# Patient Record
Sex: FEMALE | Race: WHITE | HISPANIC OR LATINO | Employment: FULL TIME | URBAN - METROPOLITAN AREA
[De-identification: names, ages, dates, MRNs, and addresses within clinical notes are randomized per-mention and may not be internally consistent; named-entity substitution may affect disease eponyms.]

---

## 2024-01-13 ENCOUNTER — HOSPITAL ENCOUNTER (EMERGENCY)
Facility: HOSPITAL | Age: 19
Discharge: HOME | End: 2024-01-13
Attending: EMERGENCY MEDICINE

## 2024-01-13 VITALS
SYSTOLIC BLOOD PRESSURE: 130 MMHG | RESPIRATION RATE: 18 BRPM | HEART RATE: 80 BPM | TEMPERATURE: 98 F | OXYGEN SATURATION: 98 % | DIASTOLIC BLOOD PRESSURE: 70 MMHG | WEIGHT: 180.56 LBS

## 2024-01-13 DIAGNOSIS — B96.89 BACTERIAL VAGINOSIS: ICD-10-CM

## 2024-01-13 DIAGNOSIS — N73.0 PID (ACUTE PELVIC INFLAMMATORY DISEASE): ICD-10-CM

## 2024-01-13 DIAGNOSIS — N76.0 BACTERIAL VAGINOSIS: ICD-10-CM

## 2024-01-13 DIAGNOSIS — N89.8 VAGINAL DISCHARGE: Primary | ICD-10-CM

## 2024-01-13 LAB
B-HCG UR QL: NEGATIVE
BACTERIA GENITAL QL WET PREP: ABNORMAL
BILIRUB UR QL STRIP: NEGATIVE
CLARITY UR: CLEAR
CLUE CELLS VAG QL WET PREP: ABNORMAL
COLOR UR: YELLOW
FILAMENT FUNGI VAG WET PREP-#/AREA: ABNORMAL
GLUCOSE UR QL STRIP: NEGATIVE
HGB UR QL STRIP: NEGATIVE
KETONES UR QL STRIP: NEGATIVE
LEUKOCYTE ESTERASE UR QL STRIP: NEGATIVE
NITRITE UR QL STRIP: NEGATIVE
PH UR STRIP: 6 [PH] (ref 5–8)
PROT UR QL STRIP: NEGATIVE
SP GR UR STRIP: 1.01 (ref 1–1.03)
SPECIMEN SOURCE: ABNORMAL
T VAGINALIS GENITAL QL WET PREP: ABNORMAL
URN SPEC COLLECT METH UR: NORMAL
UROBILINOGEN UR STRIP-ACNC: NEGATIVE EU/DL
WBC #/AREA VAG WET PREP: ABNORMAL
YEAST GENITAL QL WET PREP: ABNORMAL

## 2024-01-13 PROCEDURE — 81003 URINALYSIS AUTO W/O SCOPE: CPT | Performed by: REGISTERED NURSE

## 2024-01-13 PROCEDURE — 81025 URINE PREGNANCY TEST: CPT | Performed by: REGISTERED NURSE

## 2024-01-13 PROCEDURE — 63600175 PHARM REV CODE 636 W HCPCS: Performed by: REGISTERED NURSE

## 2024-01-13 PROCEDURE — 96372 THER/PROPH/DIAG INJ SC/IM: CPT | Performed by: REGISTERED NURSE

## 2024-01-13 PROCEDURE — 87491 CHLMYD TRACH DNA AMP PROBE: CPT | Performed by: REGISTERED NURSE

## 2024-01-13 PROCEDURE — 87210 SMEAR WET MOUNT SALINE/INK: CPT | Performed by: REGISTERED NURSE

## 2024-01-13 PROCEDURE — 99284 EMERGENCY DEPT VISIT MOD MDM: CPT

## 2024-01-13 RX ORDER — DOXYCYCLINE 100 MG/1
100 CAPSULE ORAL 2 TIMES DAILY
Qty: 14 CAPSULE | Refills: 0 | Status: SHIPPED | OUTPATIENT
Start: 2024-01-13 | End: 2024-01-20

## 2024-01-13 RX ORDER — METRONIDAZOLE 500 MG/1
500 TABLET ORAL 2 TIMES DAILY
Qty: 14 TABLET | Refills: 0 | Status: SHIPPED | OUTPATIENT
Start: 2024-01-13 | End: 2024-01-20

## 2024-01-13 RX ORDER — CEFTRIAXONE 500 MG/1
500 INJECTION, POWDER, FOR SOLUTION INTRAMUSCULAR; INTRAVENOUS
Status: COMPLETED | OUTPATIENT
Start: 2024-01-13 | End: 2024-01-13

## 2024-01-13 RX ADMIN — CEFTRIAXONE SODIUM 500 MG: 500 INJECTION, POWDER, FOR SOLUTION INTRAMUSCULAR; INTRAVENOUS at 07:01

## 2024-01-14 NOTE — ED NOTES
Patient identifiers verified and correct for Adeline Corral.      Pt presented to with pelvic pain and burning on urination.    LOC: The patient is awake, alert and aware of environment with an appropriate affect, the patient is oriented x 3 and speaking appropriately.  APPEARANCE: Patient resting comfortably and in no acute distress, patient is clean and well groomed, patient's clothing is properly fastened.  SKIN: The skin is warm and dry, color consistent with ethnicity, patient has normal skin turgor and moist mucus membranes, skin intact, no breakdown or bruising noted.  MUSCULOSKELETAL: Patient moving all extremities spontaneously.  RESPIRATORY: Airway is open and patent, respirations are spontaneous.  CARDIAC: Patient has a normal rate, no periphreal edema noted, capillary refill < 3 seconds.  ABDOMEN: Soft and non tender to palpation. Lower abdomen pain

## 2024-01-14 NOTE — ED PROVIDER NOTES
Encounter Date: 1/13/2024       History     Chief Complaint   Patient presents with    Pelvic Pain     Pelvic area pain x 2 months, worse today, pain increases after urinating, denies burning, + vaginal discharge (white), pain after intercourse. Denies fever, nausea or vomiting.     18-year-old female presents emergency department with complaints of vaginal discharge for the last 2 months.  Patient reports pain is worse today.  Associated symptoms include pelvic pain.  Denies any nausea/vomiting    The history is provided by the patient.     Review of patient's allergies indicates:  No Known Allergies  No past medical history on file.  No past surgical history on file.  No family history on file.     Review of Systems   Constitutional:  Negative for fever.   HENT:  Negative for sore throat.    Respiratory:  Negative for shortness of breath.    Cardiovascular:  Negative for chest pain.   Gastrointestinal:  Negative for nausea.   Genitourinary:  Positive for dyspareunia, dysuria, pelvic pain and vaginal discharge.   Musculoskeletal:  Negative for back pain.   Skin:  Negative for rash.   Neurological:  Negative for weakness.   Hematological:  Does not bruise/bleed easily.   All other systems reviewed and are negative.      Physical Exam     Initial Vitals [01/13/24 1756]   BP Pulse Resp Temp SpO2   126/66 76 16 98.6 °F (37 °C) 98 %      MAP       --         Physical Exam    Constitutional: She appears well-developed and well-nourished. She is not diaphoretic. No distress.   HENT:   Head: Normocephalic and atraumatic.   Eyes: Conjunctivae and EOM are normal. Pupils are equal, round, and reactive to light.   Neck: Neck supple.   Normal range of motion.  Cardiovascular:  Normal rate, regular rhythm and normal heart sounds.           No murmur heard.  Pulmonary/Chest: Breath sounds normal. No respiratory distress. She has no wheezes. She has no rales.   Abdominal: Abdomen is soft. Bowel sounds are normal. There is no  abdominal tenderness. There is no rebound and no guarding.   Genitourinary:    Genitourinary Comments: Pelvic: A female chaperone was present for this examination. Nl external inspection. No lesions or abnormalities were visible on the labia majora or minora. Cervical os is closed. There is CMT. There is no blood in the vaginal vault. Scant discharge. Mild R adnexal tenderness. No adnexal masses.       Musculoskeletal:         General: No tenderness or edema. Normal range of motion.      Cervical back: Normal range of motion and neck supple.     Neurological: She is alert and oriented to person, place, and time. No cranial nerve deficit. GCS score is 15. GCS eye subscore is 4. GCS verbal subscore is 5. GCS motor subscore is 6.   Skin: Skin is warm and dry. Capillary refill takes less than 2 seconds.   Psychiatric: She has a normal mood and affect. Thought content normal.         ED Course   Procedures  Labs Reviewed   VAGINAL SCREEN - Abnormal; Notable for the following components:       Result Value    Clue Cells Occasional (*)     WBC - Vaginal Screen Rare (*)     Bacteria - Vaginal Screen Occasional (*)     All other components within normal limits    Narrative:     Release to patient->Immediate   C. TRACHOMATIS/N. GONORRHOEAE BY AMP DNA   URINALYSIS, REFLEX TO URINE CULTURE    Narrative:     Specimen Source->Urine   PREGNANCY TEST, URINE RAPID    Narrative:     Specimen Source->Urine          Imaging Results    None          Medications   cefTRIAXone injection 500 mg (has no administration in time range)     Medical Decision Making  Amount and/or Complexity of Data Reviewed  Labs: ordered.     Details: Clue cells, bacteria    Risk  Prescription drug management.  Risk Details: Treat for suspected PID.  Return the emergency department for worsening symptoms.  Follow up with primary care next week.                                      Clinical Impression:  Final diagnoses:  [N89.8] Vaginal discharge  (Primary)  [N73.0] PID (acute pelvic inflammatory disease)  [N76.0, B96.89] Bacterial vaginosis          ED Disposition Condition    Discharge Stable          ED Prescriptions       Medication Sig Dispense Start Date End Date Auth. Provider    doxycycline (VIBRAMYCIN) 100 MG Cap Take 1 capsule (100 mg total) by mouth 2 (two) times daily. for 7 days 14 capsule 1/13/2024 1/20/2024 Lyle Arriaga Jr., FNP    metroNIDAZOLE (FLAGYL) 500 MG tablet Take 1 tablet (500 mg total) by mouth 2 (two) times daily. for 7 days 14 tablet 1/13/2024 1/20/2024 Lyle Arriaga Jr., FNP          Follow-up Information       Follow up With Specialties Details Why Contact Info    [facility] - Emergency Dept. Emergency Medicine  If symptoms worsen [id number] [facility] [id number] [phone]             Lyle Arriaga Jr., FNP  01/13/24 6381

## 2024-01-16 LAB
C TRACH DNA SPEC QL NAA+PROBE: NOT DETECTED
N GONORRHOEA DNA SPEC QL NAA+PROBE: NOT DETECTED

## 2025-04-27 ENCOUNTER — HOSPITAL ENCOUNTER (EMERGENCY)
Facility: HOSPITAL | Age: 20
Discharge: HOME | End: 2025-04-27
Attending: EMERGENCY MEDICINE

## 2025-04-27 VITALS
OXYGEN SATURATION: 100 % | WEIGHT: 150.63 LBS | RESPIRATION RATE: 20 BRPM | TEMPERATURE: 99 F | SYSTOLIC BLOOD PRESSURE: 117 MMHG | HEIGHT: 64 IN | HEART RATE: 74 BPM | DIASTOLIC BLOOD PRESSURE: 64 MMHG | BODY MASS INDEX: 25.71 KG/M2

## 2025-04-27 DIAGNOSIS — I95.1 ORTHOSTATIC HYPOTENSION: Primary | ICD-10-CM

## 2025-04-27 DIAGNOSIS — R42 DIZZINESS: ICD-10-CM

## 2025-04-27 LAB
ABSOLUTE EOSINOPHIL (OHS): 0.26 K/UL
ABSOLUTE MONOCYTE (OHS): 0.52 K/UL (ref 0.3–1)
ABSOLUTE NEUTROPHIL COUNT (OHS): 3.02 K/UL (ref 1.8–7.7)
ALBUMIN SERPL BCP-MCNC: 3.9 G/DL (ref 3.5–5.2)
ALP SERPL-CCNC: 51 UNIT/L (ref 40–150)
ALT SERPL W/O P-5'-P-CCNC: 15 UNIT/L (ref 10–44)
ANION GAP (OHS): 8 MMOL/L (ref 8–16)
AST SERPL-CCNC: 16 UNIT/L (ref 11–45)
B-HCG UR QL: NEGATIVE
BASOPHILS # BLD AUTO: 0.03 K/UL
BASOPHILS NFR BLD AUTO: 0.4 %
BILIRUB SERPL-MCNC: 0.4 MG/DL (ref 0.1–1)
BILIRUB UR QL STRIP.AUTO: NEGATIVE
BUN SERPL-MCNC: 18 MG/DL (ref 6–20)
CALCIUM SERPL-MCNC: 8.7 MG/DL (ref 8.7–10.5)
CHLORIDE SERPL-SCNC: 106 MMOL/L (ref 95–110)
CLARITY UR: CLEAR
CO2 SERPL-SCNC: 25 MMOL/L (ref 23–29)
COLOR UR AUTO: COLORLESS
CREAT SERPL-MCNC: 0.7 MG/DL (ref 0.5–1.4)
ERYTHROCYTE [DISTWIDTH] IN BLOOD BY AUTOMATED COUNT: 13 % (ref 11.5–14.5)
GFR SERPLBLD CREATININE-BSD FMLA CKD-EPI: >60 ML/MIN/1.73/M2
GLUCOSE SERPL-MCNC: 88 MG/DL (ref 70–110)
GLUCOSE UR QL STRIP: NEGATIVE
HCT VFR BLD AUTO: 38.8 % (ref 37–48.5)
HCV AB SERPL QL IA: NEGATIVE
HGB BLD-MCNC: 12.7 GM/DL (ref 12–16)
HGB UR QL STRIP: NEGATIVE
HIV 1+2 AB+HIV1 P24 AG SERPL QL IA: NEGATIVE
HOLD SPECIMEN: NORMAL
IMM GRANULOCYTES # BLD AUTO: 0.01 K/UL (ref 0–0.04)
IMM GRANULOCYTES NFR BLD AUTO: 0.1 % (ref 0–0.5)
KETONES UR QL STRIP: NEGATIVE
LEUKOCYTE ESTERASE UR QL STRIP: NEGATIVE
LYMPHOCYTES # BLD AUTO: 3.69 K/UL (ref 1–4.8)
MCH RBC QN AUTO: 27.6 PG (ref 27–31)
MCHC RBC AUTO-ENTMCNC: 32.7 G/DL (ref 32–36)
MCV RBC AUTO: 84 FL (ref 82–98)
NITRITE UR QL STRIP: NEGATIVE
NUCLEATED RBC (/100WBC) (OHS): 0 /100 WBC
PH UR STRIP: 7 [PH]
PLATELET # BLD AUTO: 221 K/UL (ref 150–450)
PMV BLD AUTO: 11.5 FL (ref 9.2–12.9)
POTASSIUM SERPL-SCNC: 3.6 MMOL/L (ref 3.5–5.1)
PROT SERPL-MCNC: 7.7 GM/DL (ref 6–8.4)
PROT UR QL STRIP: NEGATIVE
RBC # BLD AUTO: 4.6 M/UL (ref 4–5.4)
RELATIVE EOSINOPHIL (OHS): 3.5 %
RELATIVE LYMPHOCYTE (OHS): 49 % (ref 18–48)
RELATIVE MONOCYTE (OHS): 6.9 % (ref 4–15)
RELATIVE NEUTROPHIL (OHS): 40.1 % (ref 38–73)
SODIUM SERPL-SCNC: 139 MMOL/L (ref 136–145)
SP GR UR STRIP: 1
TROPONIN I SERPL DL<=0.01 NG/ML-MCNC: 0.01 NG/ML
UROBILINOGEN UR STRIP-ACNC: NEGATIVE EU/DL
WBC # BLD AUTO: 7.53 K/UL (ref 3.9–12.7)

## 2025-04-27 PROCEDURE — 87389 HIV-1 AG W/HIV-1&-2 AB AG IA: CPT | Performed by: EMERGENCY MEDICINE

## 2025-04-27 PROCEDURE — 81025 URINE PREGNANCY TEST: CPT | Performed by: EMERGENCY MEDICINE

## 2025-04-27 PROCEDURE — 85025 COMPLETE CBC W/AUTO DIFF WBC: CPT | Performed by: EMERGENCY MEDICINE

## 2025-04-27 PROCEDURE — 93010 ELECTROCARDIOGRAM REPORT: CPT | Mod: ,,, | Performed by: INTERNAL MEDICINE

## 2025-04-27 PROCEDURE — 84484 ASSAY OF TROPONIN QUANT: CPT | Performed by: EMERGENCY MEDICINE

## 2025-04-27 PROCEDURE — 96360 HYDRATION IV INFUSION INIT: CPT

## 2025-04-27 PROCEDURE — 81003 URINALYSIS AUTO W/O SCOPE: CPT | Performed by: EMERGENCY MEDICINE

## 2025-04-27 PROCEDURE — 93005 ELECTROCARDIOGRAM TRACING: CPT

## 2025-04-27 PROCEDURE — 80053 COMPREHEN METABOLIC PANEL: CPT | Performed by: EMERGENCY MEDICINE

## 2025-04-27 PROCEDURE — 86803 HEPATITIS C AB TEST: CPT | Performed by: EMERGENCY MEDICINE

## 2025-04-27 PROCEDURE — 25000003 PHARM REV CODE 250: Performed by: EMERGENCY MEDICINE

## 2025-04-27 PROCEDURE — 99285 EMERGENCY DEPT VISIT HI MDM: CPT | Mod: 25

## 2025-04-27 RX ADMIN — SODIUM CHLORIDE 1000 ML: 9 INJECTION, SOLUTION INTRAVENOUS at 06:04

## 2025-04-27 NOTE — ED PROVIDER NOTES
SCRIBE #1 NOTE: I, Jazlyn Ashley, am scribing for, and in the presence of, Jarrod Sprague Jr., MD. I have scribed the entire note.       History     Chief Complaint   Patient presents with    Headache    Weakness     Feeling like she has to pass out for the past 2 months with pain in the back of her head. Pt states increase in weakness worsened over the week.      Review of patient's allergies indicates:  No Known Allergies      History of Present Illness     HPI    4/27/2025, 5:51 PM  History obtained from the patient, medical records, and EMS      History of Present Illness: Adeline Corral is a 19 y.o. female patient with no previous PMHx reported who presents to the Emergency Department for evaluation of weakness which began about 2 months ago. Pt states that for the last 2 months, she has felt very light-headed upon standing up. She notes also feeling some palpitations and skipped beats when these episodes occur. Symptoms are constant and moderate in severity. No mitigating or exacerbating factors reported. Patient denies any fever, chills, hematuria, or blood in stool. No prior Tx specified.  No further complaints or concerns at this time.       Arrival mode: Personal Transportation    PCP: No, Primary Doctor        Past Medical History:  No past medical history on file.    Past Surgical History:  No past surgical history on file.      Family History:  No family history on file.    Social History:  Social History     Tobacco Use    Smoking status: Not on file    Smokeless tobacco: Not on file   Substance and Sexual Activity    Alcohol use: Not on file    Drug use: Not on file    Sexual activity: Not on file        Review of Systems     Review of Systems   Constitutional:  Negative for chills and fever.   HENT:  Negative for sore throat.    Respiratory:  Negative for shortness of breath.    Cardiovascular:  Positive for palpitations. Negative for chest pain.   Gastrointestinal:  Negative for blood in stool and  "nausea.   Genitourinary:  Negative for dysuria and hematuria.   Musculoskeletal:  Negative for back pain.   Skin:  Negative for rash.   Neurological:  Positive for weakness and light-headedness.   Hematological:  Does not bruise/bleed easily.   All other systems reviewed and are negative.     Physical Exam     Initial Vitals [04/27/25 1736]   BP Pulse Resp Temp SpO2   133/73 66 15 98.5 °F (36.9 °C) 99 %      MAP       --          Physical Exam  Nursing Notes and Vital Signs Reviewed.  Constitutional: Patient is in no acute distress. Well-developed and well-nourished.  Head: Atraumatic. Normocephalic.  Eyes:  EOM intact.  No scleral icterus.  ENT: Mucous membranes are moist.  Nares clear   Neck:  Full ROM. No JVD.  Cardiovascular: Regular rate. Regular rhythm No murmurs, rubs, or gallops. Distal pulses are 2+ and symmetric  Pulmonary/Chest: No respiratory distress. Clear to auscultation bilaterally. No wheezing or rales.  Equal chest wall rise bilaterally  Abdominal: Soft and non-distended.  There is no tenderness.  No rebound, guarding, or rigidity. Good bowel sounds.  Genitourinary: No CVA tenderness.  No suprapubic tenderness  Musculoskeletal: Moves all extremities. No obvious deformities.  5 x 5 strength in all extremities   Skin: Warm and dry.  Neurological:  Alert, awake, and appropriate.  Normal speech.  No acute focal neurological deficits are appreciated.  Two through 12 intact bilaterally.  Psychiatric: Normal affect. Good eye contact. Appropriate in content.     ED Course   Procedures  ED Vital Signs:  Vitals:    04/27/25 1736 04/27/25 1749 04/27/25 1752 04/27/25 1754   BP: 133/73 124/66 125/72 127/76   Pulse: 66 65 66 84   Resp: 15 14 19 (!) 38   Temp: 98.5 °F (36.9 °C)      TempSrc: Oral      SpO2: 99% 100% 100% 98%   Weight: 68.3 kg (150 lb 9.6 oz)      Height: 5' 4" (1.626 m)       04/27/25 1830 04/27/25 1900   BP: 111/64 117/64   Pulse: 62 74   Resp: 20 20   Temp:     TempSrc:     SpO2: 100% 100% "   Weight:     Height:         Abnormal Lab Results:  Labs Reviewed   URINALYSIS, REFLEX TO URINE CULTURE - Abnormal       Result Value    Color, UA Colorless (*)     Appearance, UA Clear      pH, UA 7.0      Spec Grav UA 1.005      Protein, UA Negative      Glucose, UA Negative      Ketones, UA Negative      Bilirubin, UA Negative      Blood, UA Negative      Nitrites, UA Negative      Urobilinogen, UA Negative      Leukocyte Esterase, UA Negative     CBC WITH DIFFERENTIAL - Abnormal    WBC 7.53      RBC 4.60      HGB 12.7      HCT 38.8      MCV 84      MCH 27.6      MCHC 32.7      RDW 13.0      Platelet Count 221      MPV 11.5      Nucleated RBC 0      Neut % 40.1      Lymph % 49.0 (*)     Mono % 6.9      Eos % 3.5      Basophil % 0.4      Imm Grans % 0.1      Neut # 3.02      Lymph # 3.69      Mono # 0.52      Eos # 0.26      Baso # 0.03      Imm Grans # 0.01     COMPREHENSIVE METABOLIC PANEL - Normal    Sodium 139      Potassium 3.6      Chloride 106      CO2 25      Glucose 88      BUN 18      Creatinine 0.7      Calcium 8.7      Protein Total 7.7      Albumin 3.9      Bilirubin Total 0.4      ALP 51      AST 16      ALT 15      Anion Gap 8      eGFR >60     TROPONIN I - Normal    Troponin-I 0.006     PREGNANCY TEST, URINE RAPID - Normal    hCG Qualitative, Urine Negative     HEPATITIS C ANTIBODY - Normal    Hep C Ab Interp Negative     HIV 1 / 2 ANTIBODY - Normal    HIV 1/2 Ag/Ab Negative     CBC W/ AUTO DIFFERENTIAL    Narrative:     The following orders were created for panel order CBC auto differential.  Procedure                               Abnormality         Status                     ---------                               -----------         ------                     CBC with Differential[[id number]]       Abnormal            Final result                 Please view results for these tests on the individual orders.   HEP C VIRUS HOLD SPECIMEN   GREY TOP URINE HOLD        All Lab Results:  Results for  orders placed or performed during the hospital encounter of 04/27/25   Comprehensive metabolic panel    Collection Time: 04/27/25  6:12 PM   Result Value Ref Range    Sodium 139 136 - 145 mmol/L    Potassium 3.6 3.5 - 5.1 mmol/L    Chloride 106 95 - 110 mmol/L    CO2 25 23 - 29 mmol/L    Glucose 88 70 - 110 mg/dL    BUN 18 6 - 20 mg/dL    Creatinine 0.7 0.5 - 1.4 mg/dL    Calcium 8.7 8.7 - 10.5 mg/dL    Protein Total 7.7 6.0 - 8.4 gm/dL    Albumin 3.9 3.5 - 5.2 g/dL    Bilirubin Total 0.4 0.1 - 1.0 mg/dL    ALP 51 40 - 150 unit/L    AST 16 11 - 45 unit/L    ALT 15 10 - 44 unit/L    Anion Gap 8 8 - 16 mmol/L    eGFR >60 >60 mL/min/1.73/m2   Troponin I    Collection Time: 04/27/25  6:12 PM   Result Value Ref Range    Troponin-I 0.006 <=0.026 ng/mL   CBC with Differential    Collection Time: 04/27/25  6:12 PM   Result Value Ref Range    WBC 7.53 3.90 - 12.70 K/uL    RBC 4.60 4.00 - 5.40 M/uL    HGB 12.7 12.0 - 16.0 gm/dL    HCT 38.8 37.0 - 48.5 %    MCV 84 82 - 98 fL    MCH 27.6 27.0 - 31.0 pg    MCHC 32.7 32.0 - 36.0 g/dL    RDW 13.0 11.5 - 14.5 %    Platelet Count 221 150 - 450 K/uL    MPV 11.5 9.2 - 12.9 fL    Nucleated RBC 0 <=0 /100 WBC    Neut % 40.1 38 - 73 %    Lymph % 49.0 (H) 18 - 48 %    Mono % 6.9 4 - 15 %    Eos % 3.5 <=8 %    Basophil % 0.4 <=1.9 %    Imm Grans % 0.1 0.0 - 0.5 %    Neut # 3.02 1.8 - 7.7 K/uL    Lymph # 3.69 1 - 4.8 K/uL    Mono # 0.52 0.3 - 1 K/uL    Eos # 0.26 <=0.5 K/uL    Baso # 0.03 <=0.2 K/uL    Imm Grans # 0.01 0.00 - 0.04 K/uL   Hepatitis C Antibody    Collection Time: 04/27/25  6:12 PM   Result Value Ref Range    Hep C Ab Interp Negative Negative   HIV 1/2 Ag/Ab (4th Gen)    Collection Time: 04/27/25  6:12 PM   Result Value Ref Range    HIV 1/2 Ag/Ab Negative Negative   Urinalysis, Reflex to Urine Culture    Collection Time: 04/27/25  6:27 PM    Specimen: Urine, Clean Catch   Result Value Ref Range    Color, UA Colorless (A) Straw, Gina, Yellow, Light-Orange    Appearance, UA  Clear Clear    pH, UA 7.0 5.0 - 8.0    Spec Grav UA 1.005 1.005 - 1.030    Protein, UA Negative Negative    Glucose, UA Negative Negative    Ketones, UA Negative Negative    Bilirubin, UA Negative Negative    Blood, UA Negative Negative    Nitrites, UA Negative Negative    Urobilinogen, UA Negative <2.0 EU/dL    Leukocyte Esterase, UA Negative Negative   Pregnancy, urine rapid    Collection Time: 04/27/25  6:27 PM   Result Value Ref Range    hCG Qualitative, Urine Negative Negative       Imaging Results:  Imaging Results              X-Ray Chest AP Portable (Final result)  Result time 04/27/25 18:10:42      Final result by Valerio Patel MD (04/27/25 18:10:42)                   Impression:      1.  Negative for acute process involving the chest.  2.  Incidental findings as noted above.    Finalized on: 4/27/2025 6:10 PM By:  Valerio Patel MD  [facility]# [id number]      2025-04-27 18:12:50.361     [facility]               Narrative:    EXAM:  XR CHEST AP PORTABLE    CLINICAL INDICATION:  Dizziness and vertigo    FINDINGS:  Frontal views of the chest were obtained.    No comparison studies are available. EKG leads overlie the chest.  Clothing artifact.  The lungs are clear. The cardiac silhouette size is normal. The trachea is midline and the mediastinal width is normal. Negative for focal infiltrate, effusion or pneumothorax. Pulmonary vasculature is normal. Negative for osseous abnormalities.  Mildly tortuous aorta.  Marginal spondylosis.                                         The EKG was ordered, reviewed, and independently interpreted by the ED provider.  Interpretation time: 18:08  Rate: 70 BPM  Rhythm: Normal sinus rhythm with sinus arrhythmia  Interpretation: Rightward axis. No STEMI.             The Emergency Provider reviewed the vital signs and test results, which are outlined above.     ED Discussion     7:00 PM: Reassessed pt at this time. Discussed with patient and family all pertinent ED information and results.  Discussed pt dx and plan of tx. Gave the patient and family all f/u and return to the ED instructions. All questions and concerns were addressed at this time. Patient and family express understanding of information and instructions, and is comfortable with plan to discharge. Pt is stable for discharge.     I discussed with patient and family that evaluation in the ED does not suggest any emergent or life threatening medical conditions requiring immediate intervention beyond what was provided in the ED, and I believe patient is safe for discharge. Regardless, an unremarkable evaluation in the ED does not preclude the development or presence of a serious or life threatening condition. As such, I instructed that the patient is to return immediately for any worsening or change in current symptoms.      ED Course as of 04/27/25 1921   Sun Apr 27, 2025 1803 The patient is symptomatic with standing on her orthostatics. [RT]   1804 Cardiac monitor interpretation  Independent interpretation  Indication: Weakness  Normal sinus rhythm.  Rate 69.  No STEMI [RT]      ED Course User Index  [RT] Jarrod Sprague Jr., MD     Medical Decision Making  Differential diagnosis: Dehydration, orthostatic hypotension, viral syndrome, cardiac event, dysrhythmia    The patient is evaluated history physical examination.  She has had several months of intermittent dizziness when standing.  She is a orthostatic positive here and markedly improved with IV fluids.  Her cardiac workup is benign as is the remainder of her workup.  She is stable safe for discharge in my opinion    Amount and/or Complexity of Data Reviewed  Labs: ordered. Decision-making details documented in ED Course.     Details: UA negative UPT negative HIV negative hep C negative troponin normal CMP normal CBC normal  Radiology: ordered and independent interpretation performed. Decision-making details documented in ED Course.     Details: That has x-ray clear  ECG/medicine  tests: ordered and independent interpretation performed. Decision-making details documented in ED Course.     Details: Normal EKG.  Orthostatics are positive    Risk  OTC drugs.  Prescription drug management.  Decision regarding hospitalization.                ED Medication(s):  Medications   sodium chloride 0.9% bolus 1,000 mL 1,000 mL (1,000 mLs Intravenous New Bag 4/27/25 1043)       New Prescriptions    No medications on file        Follow-up Information       Clinic, [facility].    Contact information:  [address] [place] [id number] [phone]                                 Scribe Attestation:   Scribe #1: I performed the above scribed service and the documentation accurately describes the services I performed. I attest to the accuracy of the note.     Attending:   Physician Attestation Statement for Scribe #1: I, Jarrod Sprague Jr., MD, personally performed the services described in this documentation, as scribed by Jazlyn Ramirez, in my presence, and it is both accurate and complete.           Clinical Impression       ICD-10-CM ICD-9-CM   1. Orthostatic hypotension  I95.1 458.0   2. Dizziness  R42 780.4       Disposition:   Disposition: Discharged  Condition: Stable         Jarrod Sprague Jr., MD  04/27/25 1922

## 2025-04-29 LAB
OHS QRS DURATION: 88 MS
OHS QTC CALCULATION: 399 MS

## 2025-07-21 ENCOUNTER — PATIENT MESSAGE (OUTPATIENT)
Dept: RESEARCH | Facility: HOSPITAL | Age: 20
End: 2025-07-21